# Patient Record
Sex: MALE | Race: WHITE | URBAN - METROPOLITAN AREA
[De-identification: names, ages, dates, MRNs, and addresses within clinical notes are randomized per-mention and may not be internally consistent; named-entity substitution may affect disease eponyms.]

---

## 2020-05-21 ENCOUNTER — TELEPHONE (OUTPATIENT)
Dept: CARDIOLOGY CLINIC | Age: 60
End: 2020-05-21

## 2020-05-28 ENCOUNTER — OFFICE VISIT (OUTPATIENT)
Dept: CARDIOLOGY CLINIC | Age: 60
End: 2020-05-28
Payer: COMMERCIAL

## 2020-05-28 ENCOUNTER — TELEPHONE (OUTPATIENT)
Dept: CARDIOLOGY | Age: 60
End: 2020-05-28

## 2020-05-28 ENCOUNTER — TELEPHONE (OUTPATIENT)
Dept: CARDIOLOGY CLINIC | Age: 60
End: 2020-05-28

## 2020-05-28 VITALS
HEIGHT: 72 IN | HEART RATE: 74 BPM | SYSTOLIC BLOOD PRESSURE: 118 MMHG | BODY MASS INDEX: 28.44 KG/M2 | DIASTOLIC BLOOD PRESSURE: 78 MMHG | RESPIRATION RATE: 16 BRPM | WEIGHT: 210 LBS | TEMPERATURE: 97.9 F

## 2020-05-28 PROCEDURE — G8419 CALC BMI OUT NRM PARAM NOF/U: HCPCS | Performed by: INTERNAL MEDICINE

## 2020-05-28 PROCEDURE — 99245 OFF/OP CONSLTJ NEW/EST HI 55: CPT | Performed by: INTERNAL MEDICINE

## 2020-05-28 PROCEDURE — G8427 DOCREV CUR MEDS BY ELIG CLIN: HCPCS | Performed by: INTERNAL MEDICINE

## 2020-05-28 PROCEDURE — 93000 ELECTROCARDIOGRAM COMPLETE: CPT | Performed by: INTERNAL MEDICINE

## 2020-05-28 RX ORDER — LORATADINE 10 MG/1
10 CAPSULE, LIQUID FILLED ORAL DAILY
COMMUNITY
End: 2020-07-28

## 2020-05-28 RX ORDER — MULTIVIT WITH MINERALS/LUTEIN
250 TABLET ORAL DAILY
COMMUNITY

## 2020-05-28 NOTE — TELEPHONE ENCOUNTER
Per Dr. Patsy Fuentes, patient needs WILBUR workup per PCP. Will also need stress and echo results. Called Dr. Laurie Arredondo' office, office closed.
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
## 2020-06-01 ENCOUNTER — TELEPHONE (OUTPATIENT)
Dept: CARDIOLOGY CLINIC | Age: 60
End: 2020-06-01

## 2020-06-01 ENCOUNTER — TELEPHONE (OUTPATIENT)
Dept: CARDIOLOGY | Age: 60
End: 2020-06-01

## 2020-06-01 ENCOUNTER — HOSPITAL ENCOUNTER (OUTPATIENT)
Dept: CARDIOLOGY | Age: 60
Discharge: HOME | End: 2020-06-01
Payer: COMMERCIAL

## 2020-06-01 VITALS
SYSTOLIC BLOOD PRESSURE: 140 MMHG | BODY MASS INDEX: 28.44 KG/M2 | OXYGEN SATURATION: 98 % | WEIGHT: 210 LBS | DIASTOLIC BLOOD PRESSURE: 70 MMHG | HEIGHT: 72 IN | HEART RATE: 77 BPM

## 2020-06-01 LAB
LV EF: 75 %
LVEF MODALITY: NORMAL

## 2020-06-01 PROCEDURE — 3430000000 HC RX DIAGNOSTIC RADIOPHARMACEUTICAL: Performed by: INTERNAL MEDICINE

## 2020-06-01 PROCEDURE — 78452 HT MUSCLE IMAGE SPECT MULT: CPT

## 2020-06-01 PROCEDURE — 2580000003 HC RX 258: Performed by: INTERNAL MEDICINE

## 2020-06-01 PROCEDURE — 93017 CV STRESS TEST TRACING ONLY: CPT

## 2020-06-01 PROCEDURE — A9500 TC99M SESTAMIBI: HCPCS | Performed by: INTERNAL MEDICINE

## 2020-06-01 RX ORDER — SODIUM CHLORIDE 0.9 % (FLUSH) 0.9 %
10 SYRINGE (ML) INJECTION PRN
Status: DISCONTINUED | OUTPATIENT
Start: 2020-06-01 | End: 2020-06-02 | Stop reason: HOSPADM

## 2020-06-01 RX ADMIN — SODIUM CHLORIDE, PRESERVATIVE FREE 10 ML: 5 INJECTION INTRAVENOUS at 09:13

## 2020-06-01 RX ADMIN — Medication 30.2 MILLICURIE: at 09:13

## 2020-06-01 RX ADMIN — SODIUM CHLORIDE, PRESERVATIVE FREE 10 ML: 5 INJECTION INTRAVENOUS at 07:47

## 2020-06-01 RX ADMIN — Medication 10.4 MILLICURIE: at 07:47

## 2020-06-09 ENCOUNTER — HOSPITAL ENCOUNTER (OUTPATIENT)
Dept: SLEEP CENTER | Age: 60
Discharge: HOME | End: 2020-06-09
Payer: COMMERCIAL

## 2020-06-09 VITALS
OXYGEN SATURATION: 98 % | HEART RATE: 74 BPM | TEMPERATURE: 98.4 F | RESPIRATION RATE: 18 BRPM | SYSTOLIC BLOOD PRESSURE: 126 MMHG | BODY MASS INDEX: 28.31 KG/M2 | HEIGHT: 72 IN | WEIGHT: 209 LBS | DIASTOLIC BLOOD PRESSURE: 84 MMHG

## 2020-06-09 PROCEDURE — 95810 POLYSOM 6/> YRS 4/> PARAM: CPT

## 2020-06-09 ASSESSMENT — SLEEP AND FATIGUE QUESTIONNAIRES
HOW LIKELY ARE YOU TO NOD OFF OR FALL ASLEEP WHILE SITTING INACTIVE IN A PUBLIC PLACE: 0
HOW LIKELY ARE YOU TO NOD OFF OR FALL ASLEEP WHILE SITTING AND TALKING TO SOMEONE: 0
ESS TOTAL SCORE: 6
HOW LIKELY ARE YOU TO NOD OFF OR FALL ASLEEP WHILE SITTING AND READING: 1
HOW LIKELY ARE YOU TO NOD OFF OR FALL ASLEEP WHEN YOU ARE A PASSENGER IN A CAR FOR AN HOUR WITHOUT A BREAK: 1
HOW LIKELY ARE YOU TO NOD OFF OR FALL ASLEEP WHILE WATCHING TV: 2
HOW LIKELY ARE YOU TO NOD OFF OR FALL ASLEEP IN A CAR, WHILE STOPPED FOR A FEW MINUTES IN TRAFFIC: 0
HOW LIKELY ARE YOU TO NOD OFF OR FALL ASLEEP WHILE SITTING QUIETLY AFTER LUNCH WITHOUT ALCOHOL: 0
HOW LIKELY ARE YOU TO NOD OFF OR FALL ASLEEP WHILE LYING DOWN TO REST IN THE AFTERNOON WHEN CIRCUMSTANCES PERMIT: 2

## 2020-06-10 NOTE — PROGRESS NOTES
[address] [address]                               SLEEP STUDY REPORT    PATIENT NAME: Susan Edmondson                  :        1960  MED REC NO:   [id number]                            ROOM:  ACCOUNT NO:   [de-identified]                           ADMIT DATE: 2020  PROVIDER:     Joi Farley MD    DATE OF STUDY:  2020    REFERRING PROVIDER:  Andreas Pizano M.D.    STUDY PERFORMED:  Polysomnography. INDICATION FOR POLYSOMNOGRAPHY:  Witnessed apnea, disruptive snoring,  fatigue, and trouble with memory/concentration. CURRENT MEDICATIONS:  Claritin, atorvastatin, and vitamins. INTERPRETATION:  SLEEP ARCHITECTURE:  This patient had a total time in bed of 429  minutes. Total sleep time was 384 minutes. Sleep efficiency was 90%. Sleep latency was five minutes. REM latency was 181 minutes. SLEEP STAGING:  The patient was awake 10% of the time in bed. Stage N1  was 22% and N2 was 57% of the total sleep time. Slow wave sleep was  less than 1% of the sleep time and stage REM sleep was 21% of the sleep  time. RESPIRATION SUMMARY:  APNEA:  There were five apneic events including one central, two  obstructive. and two mixed. Maximum duration of an apneic event was 26  seconds and one event occurred during REM stage sleep. HYPOPNEA:  There were 97 hypopneic events, 32 occurred during REM stage  sleep. Maximum duration was 121 seconds. APNEA/HYPOPNEA INDEX:  The apnea/hypopnea index is 16. Of the 102  respiratory events, 52 occurred in the supine position. AROUSAL ANALYSIS:  There were 101 arousals/awakenings, 50 associated  with respiratory event. The sleep disruption index is 16 (normal less  than 10). LIMB MOVEMENT SUMMARY:  There were 163 limb movements, the limb movement  index is 26 (normal less than 15). OXYGEN SATURATION:  Average oxygen saturation while awake was 93%.

## 2020-07-15 ENCOUNTER — TELEPHONE (OUTPATIENT)
Dept: CARDIOLOGY | Age: 60
End: 2020-07-15

## 2020-07-15 NOTE — TELEPHONE ENCOUNTER
CALLED PATIENT AND LEFT MESSAGE TO SCHEDULE ECHO  Electronically signed by Halley Santo on 7/15/2020 at 8:32 AM

## 2020-07-16 ENCOUNTER — TELEPHONE (OUTPATIENT)
Dept: CARDIOLOGY | Age: 60
End: 2020-07-16

## 2020-07-16 NOTE — TELEPHONE ENCOUNTER
SCHEDULED ECHO FOR 07-22-20. REVIEWED COVID CHECKLIST WITH PATIENT.   Electronically signed by Mary Cheney on 7/16/2020 at 8:04 AM

## 2020-07-21 ENCOUNTER — TELEPHONE (OUTPATIENT)
Dept: CARDIOLOGY | Age: 60
End: 2020-07-21

## 2020-07-22 ENCOUNTER — HOSPITAL ENCOUNTER (OUTPATIENT)
Dept: CARDIOLOGY | Age: 60
Discharge: HOME | End: 2020-07-22
Payer: COMMERCIAL

## 2020-07-22 LAB
LV EF: 60 %
LVEF MODALITY: NORMAL

## 2020-07-22 PROCEDURE — 93306 TTE W/DOPPLER COMPLETE: CPT | Performed by: PSYCHIATRY & NEUROLOGY

## 2020-07-27 ENCOUNTER — TELEPHONE (OUTPATIENT)
Dept: CARDIOLOGY CLINIC | Age: 60
End: 2020-07-27

## 2020-07-27 PROBLEM — I35.1 NONRHEUMATIC AORTIC VALVE INSUFFICIENCY: Status: ACTIVE | Noted: 2020-07-27

## 2020-07-27 NOTE — TELEPHONE ENCOUNTER
Dr. Moises Curtis spoke directly to patient regarding echo results. Jaqueline in Dr. María Elena Jansen office notified of need for amended echo report.

## 2020-09-01 PROBLEM — C61 MALIGNANT NEOPLASM OF PROSTATE (HCC): Status: ACTIVE | Noted: 2017-02-27

## 2020-09-01 PROBLEM — E78.5 DYSLIPIDEMIA: Status: ACTIVE | Noted: 2017-04-07

## 2021-07-26 ENCOUNTER — TELEPHONE (OUTPATIENT)
Dept: CARDIOLOGY CLINIC | Age: 61
End: 2021-07-26

## 2021-09-03 ENCOUNTER — OFFICE VISIT (OUTPATIENT)
Dept: CARDIOLOGY CLINIC | Age: 61
End: 2021-09-03
Payer: COMMERCIAL

## 2021-09-03 VITALS
RESPIRATION RATE: 18 BRPM | HEART RATE: 67 BPM | DIASTOLIC BLOOD PRESSURE: 72 MMHG | HEIGHT: 72 IN | BODY MASS INDEX: 27.83 KG/M2 | SYSTOLIC BLOOD PRESSURE: 118 MMHG | WEIGHT: 205.5 LBS

## 2021-09-03 DIAGNOSIS — R07.9 CHEST PAIN, UNSPECIFIED TYPE: Primary | ICD-10-CM

## 2021-09-03 PROCEDURE — 99213 OFFICE O/P EST LOW 20 MIN: CPT | Performed by: INTERNAL MEDICINE

## 2021-09-03 PROCEDURE — G8419 CALC BMI OUT NRM PARAM NOF/U: HCPCS | Performed by: INTERNAL MEDICINE

## 2021-09-03 PROCEDURE — 1036F TOBACCO NON-USER: CPT | Performed by: INTERNAL MEDICINE

## 2021-09-03 PROCEDURE — G8427 DOCREV CUR MEDS BY ELIG CLIN: HCPCS | Performed by: INTERNAL MEDICINE

## 2021-09-03 PROCEDURE — 93000 ELECTROCARDIOGRAM COMPLETE: CPT | Performed by: INTERNAL MEDICINE

## 2021-09-03 PROCEDURE — 3017F COLORECTAL CA SCREEN DOC REV: CPT | Performed by: INTERNAL MEDICINE

## 2021-09-03 NOTE — PROGRESS NOTES
Casimiro Richey  1960  Date of Service: 9/3/2021    Patient Active Problem List    Diagnosis Date Noted    Nonrheumatic aortic valve insufficiency 07/27/2020     Overview Note:     Trace to mild on echo 7/22/2020      Dyslipidemia 04/07/2017    Malignant neoplasm of prostate ([address] 02/27/2017       Social History     Socioeconomic History    Marital status:      Spouse name: None    Number of children: None    Years of education: None    Highest education level: None   Occupational History    None   Tobacco Use    Smoking status: Never Smoker    Smokeless tobacco: Never Used   Vaping Use    Vaping Use: Never used   Substance and Sexual Activity    Alcohol use: No    Drug use: No    Sexual activity: None   Other Topics Concern    None   Social History Narrative    None     Social Determinants of Health     Financial Resource Strain:     Difficulty of Paying Living Expenses:    Food Insecurity:     Worried About Running Out of Food in the Last Year:     Ran Out of Food in the Last Year:    Transportation Needs:     Lack of Transportation (Medical):      Lack of Transportation (Non-Medical):    Physical Activity:     Days of Exercise per Week:     Minutes of Exercise per Session:    Stress:     Feeling of Stress :    Social Connections:     Frequency of Communication with Friends and Family:     Frequency of Social Gatherings with Friends and Family:     Attends Synagogue Services:     Active Member of Clubs or Organizations:     Attends Club or Organization Meetings:     Marital Status:    Intimate Partner Violence:     Fear of Current or Ex-Partner:     Emotionally Abused:     Physically Abused:     Sexually Abused:        Current Outpatient Medications   Medication Sig Dispense Refill    loratadine (CLARITIN) 10 MG tablet Take 10 mg by mouth daily      atorvastatin (LIPITOR) 20 MG tablet TAKE 1 TABLET DAILY 90 tablet 3    CPAP Machine MISC by Does not apply route      Multiple Vitamins-Minerals (CENTRUM ADULTS PO) Take by mouth      Ascorbic Acid (VITAMIN C) 250 MG tablet Take 250 mg by mouth daily      sildenafil (VIAGRA) 100 MG tablet Take 1 tablet by mouth as needed for Erectile Dysfunction 30 tablet 0    Aspirin (ASPIR-81 PO) Take 81 mg by mouth daily        No current facility-administered medications for this visit. Allergies   Allergen Reactions    Bee Venom Anaphylaxis    Pollen Extract Itching       Chief Complaint:  Stefano Short is here today for follow up and management/recomendations for aortic regurgitation    History of Present Illness: Stefano Short states that He does household chores, goes up the stairs, does yard work & goes shopping. 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 He denies any chest discomfort or dyspnea on exertion with any of the above activities. He denies orthopnea/PND, or lower extremity edema. He denies any palpitations or presyncopal symptoms. REVIEW OF SYSTEMS:  As above. Patient does not complain of any fever, chills, nausea, vomiting or diarrhea. No focal, motor or neurological deficits. No changes in his/her vision, hearing, bowel or bladder habits. He is not known to have a history of thyroid problems. No recent nose bleeds. PHYSICAL EXAM:  Vitals:    09/03/21 0844   BP: 118/72   Pulse: 67   Resp: 18   Weight: 205 lb 8 oz (93.2 kg)   Height: 6' (1.829 m)       GENERAL:  He is alert and oriented x 3, communicates well, in no distress. NECK:  No masses, trachea is mid position. Supple, full ROM, no JVD or bruits. No palpable thyromegaly or lymphadenopathy. HEART:  Regular rate and rhythm. Normal S1 and S2. There are no abnormal murmurs or gallops. LUNGS:  Clear to auscultation bilaterally. No use of accessory muscles. symmetrical excursion. ABDOMEN:  Soft, non-tender. Normal bowel sounds. EXTREMITIES:  Full ROM x 4.   No bilateral lower

## 2023-09-05 ENCOUNTER — TELEPHONE (OUTPATIENT)
Dept: ADMINISTRATIVE | Age: 63
End: 2023-09-05

## 2023-09-05 NOTE — TELEPHONE ENCOUNTER
Pt having soreness in chest near underarm. Would like to see Dr De Lockett soon. No answer when called office. Please call pt back.  [phone]

## 2023-09-05 NOTE — TELEPHONE ENCOUNTER
Patient has complaints of pain between his arm and his chest.  It has been on the left side a couple of times over the last several months. For the last couple of weeks it has been more frequent but on the right side. The symptoms occur at rest and can be associated with some hand numbness. He denies any symptoms with exertion.

## 2023-09-06 ENCOUNTER — OFFICE VISIT (OUTPATIENT)
Dept: CARDIOLOGY CLINIC | Age: 63
End: 2023-09-06
Payer: COMMERCIAL

## 2023-09-06 VITALS
OXYGEN SATURATION: 96 % | RESPIRATION RATE: 16 BRPM | SYSTOLIC BLOOD PRESSURE: 112 MMHG | BODY MASS INDEX: 27.9 KG/M2 | DIASTOLIC BLOOD PRESSURE: 70 MMHG | HEART RATE: 81 BPM | HEIGHT: 72 IN | WEIGHT: 206 LBS

## 2023-09-06 DIAGNOSIS — R07.9 CHEST PAIN, UNSPECIFIED TYPE: Primary | ICD-10-CM

## 2023-09-06 DIAGNOSIS — R06.02 SOB (SHORTNESS OF BREATH): ICD-10-CM

## 2023-09-06 DIAGNOSIS — I35.1 NONRHEUMATIC AORTIC VALVE INSUFFICIENCY: ICD-10-CM

## 2023-09-06 PROCEDURE — G8427 DOCREV CUR MEDS BY ELIG CLIN: HCPCS | Performed by: INTERNAL MEDICINE

## 2023-09-06 PROCEDURE — 1036F TOBACCO NON-USER: CPT | Performed by: INTERNAL MEDICINE

## 2023-09-06 PROCEDURE — G8419 CALC BMI OUT NRM PARAM NOF/U: HCPCS | Performed by: INTERNAL MEDICINE

## 2023-09-06 PROCEDURE — 3017F COLORECTAL CA SCREEN DOC REV: CPT | Performed by: INTERNAL MEDICINE

## 2023-09-06 PROCEDURE — 99214 OFFICE O/P EST MOD 30 MIN: CPT | Performed by: INTERNAL MEDICINE

## 2023-09-06 PROCEDURE — 93000 ELECTROCARDIOGRAM COMPLETE: CPT | Performed by: INTERNAL MEDICINE

## 2023-09-06 RX ORDER — LORATADINE 10 MG/1
10 CAPSULE, LIQUID FILLED ORAL DAILY
COMMUNITY

## 2023-09-06 NOTE — PROGRESS NOTES
Zeb Pimentel  1960  Date of Service: 9/6/2023    Patient Active Problem List    Diagnosis Date Noted    Nonrheumatic aortic valve insufficiency 07/27/2020     Overview Note:     Trace to mild on echo 7/22/2020        Dyslipidemia 04/07/2017    Malignant neoplasm of prostate ([address]) 02/27/2017       Social History     Socioeconomic History    Marital status:      Spouse name: None    Number of children: None    Years of education: None    Highest education level: None   Tobacco Use    Smoking status: Never    Smokeless tobacco: Never   Vaping Use    Vaping Use: Never used   Substance and Sexual Activity    Alcohol use: Yes     Comment: beer social    Drug use: No    Sexual activity: Not Currently       Current Outpatient Medications   Medication Sig Dispense Refill    loratadine (CLARITIN) 10 MG capsule Take 1 capsule by mouth daily      cyanocobalamin 2000 MCG tablet Take 1 tablet by mouth daily      Vitamin E 268 MG (400 UNIT) CHEW Take 400 Units by mouth daily      levothyroxine (SYNTHROID) 25 MCG tablet TAKE 1 TABLET BY MOUTH EVERY DAY      Cholecalciferol (VITAMIN D3) 50 MCG (2000 UT) CHEW Take 4,000 Units by mouth daily      atorvastatin (LIPITOR) 20 MG tablet TAKE 1 TABLET DAILY 90 tablet 3    Ascorbic Acid (VITAMIN C) 1000 MG tablet Take 1 tablet by mouth daily      Aspirin (ASPIR-81 PO) Take 81 mg by mouth daily        No current facility-administered medications for this visit. Allergies   Allergen Reactions    Bee Venom Anaphylaxis    Pollen Extract Itching       Chief Complaint:  Zeb Pimentel is here today for follow up and management/recomendations for aortic regurgitation    History of Present Illness: Zeb Pimentel is being seen urgently today for chest discomfort. He states that for the past 6 months he has had intermittent episodes of soreness on the left side of his chest.  This was occurring approximately once per month.   However, recently it has increased to

## 2023-09-07 ENCOUNTER — TELEPHONE (OUTPATIENT)
Dept: CARDIOLOGY CLINIC | Age: 63
End: 2023-09-07

## 2023-09-08 ENCOUNTER — TELEPHONE (OUTPATIENT)
Dept: CARDIOLOGY CLINIC | Age: 63
End: 2023-09-08

## 2023-09-08 ENCOUNTER — HOSPITAL ENCOUNTER (OUTPATIENT)
Dept: CARDIOLOGY | Age: 63
Discharge: HOME | End: 2023-09-08
Attending: INTERNAL MEDICINE
Payer: COMMERCIAL

## 2023-09-08 VITALS
HEART RATE: 58 BPM | HEIGHT: 72 IN | RESPIRATION RATE: 12 BRPM | DIASTOLIC BLOOD PRESSURE: 76 MMHG | SYSTOLIC BLOOD PRESSURE: 118 MMHG | WEIGHT: 206 LBS | BODY MASS INDEX: 27.9 KG/M2

## 2023-09-08 DIAGNOSIS — R07.9 CHEST PAIN, UNSPECIFIED TYPE: ICD-10-CM

## 2023-09-08 DIAGNOSIS — R06.02 SOB (SHORTNESS OF BREATH): ICD-10-CM

## 2023-09-08 DIAGNOSIS — I35.1 NONRHEUMATIC AORTIC VALVE INSUFFICIENCY: ICD-10-CM

## 2023-09-08 PROCEDURE — 93017 CV STRESS TEST TRACING ONLY: CPT

## 2023-09-08 PROCEDURE — 93306 TTE W/DOPPLER COMPLETE: CPT

## 2023-09-08 NOTE — TELEPHONE ENCOUNTER
----- Message from Inna Smith DO sent at 9/8/2023  1:29 PM EDT -----  Let him know that there is no blood clot to cause chest pain or shortness of breath.

## 2023-09-08 NOTE — TELEPHONE ENCOUNTER
Patient notified of echo results and Dr. Guerline Mendieta recommendations. Patient put on list to call when September 2024 schedule opens.

## 2023-09-08 NOTE — TELEPHONE ENCOUNTER
----- Message from Elbert Pretty DO sent at 9/8/2023  4:11 PM EDT -----  Please let him know that the echo shows that the heart function is good. The aortic regurgitation is still only mild. The pressure in the lungs is normal.  There is nothing here that should cause any symptoms. He has no physical limitations from a cardiology standpoint. Follow-up in 1 year.

## 2023-09-08 NOTE — PROCEDURES
[facility] - [facility], [address]  [phone]    Exercise Stress Study (non-nuclear)      Name: Jamal Navas Account Number:  [de-identified]      :  1960          Sex: male         Date of Study:  2023    Height: 6' (182.9 cm)          Weight - Scale: 206 lb (93.4 kg)    Ordering Provider: Golden Ramos DO          PCP: Monica Aaron MD    Cardiologist: Golden Ramos DO              Interpreting Physician: Golden aRmos DO    Indication:   Detecting the presence and location of coronary artery disease    Clinical History:   Patient has no known history of coronary artery disease. Resting ECG:    Sinus bradycardia, 58 bpm.  Normal axis. Exercise: The patient exercised using a Evgeny protocol, completing 9:35 minutes and reaching an estimated work load of 38.19 metabolic equivalents (METS). Resting HR was 58. Peak exercise heart rate was 141 ( 90% of maximum predicted heart rate for age). Baseline /76. Peak exercise /72. The blood pressure response to exercise was normal      Exercise was terminated due to dyspnea. The patient experienced no chest pain with exercise. Pulse oximetry was used to monitor oxygen saturation during the stress test.  The study was performed on Room Air. The resting pulse oximeter was 98%. The lowest O2 saturation seen during exercise was 96 %. The average O2 saturation with exercise was 96 %. Exercise ECG:   The patient demonstrated no arrhythmias during exercise. With exercise, there were no ST segment changes of significance at the heart rate achieved. Garcia treadmill score was 9.5 implying low risk. Impression:    Exercise EKG was    negative. The patient experienced no chest pain with exercise. Garcia treadmill score was 9.5 implying low risk. Exercise capacity was above average.     Low risk general exercise

## 2023-10-27 PROBLEM — E55.9 VITAMIN D DEFICIENCY: Status: ACTIVE | Noted: 2023-10-27

## 2023-11-03 PROBLEM — E03.9 HYPOTHYROIDISM: Status: ACTIVE | Noted: 2023-11-03

## 2024-08-29 ENCOUNTER — TELEPHONE (OUTPATIENT)
Dept: CARDIOLOGY CLINIC | Age: 64
End: 2024-08-29

## 2024-08-29 NOTE — TELEPHONE ENCOUNTER
Patient calling back in to reschedule his yearly appointment due to provider not being in office. Advised we do not have any availability at this time and advised once the new schedules are released we can schedule him at that time. Patient is asking to be fit on schedule as he originally scheduled this yearly visit in February. Please contact patient to reschedule.

## 2024-08-29 NOTE — TELEPHONE ENCOUNTER
F/U scheduled for 11/26/24 at 10:10 a.m.  Left voicemail for patient with date/time and asked that he call me if this barahona not work for him and we will put him on list to schedule when January 2025 schedule comes out.

## 2024-11-26 ENCOUNTER — OFFICE VISIT (OUTPATIENT)
Dept: CARDIOLOGY CLINIC | Age: 64
End: 2024-11-26
Payer: MEDICARE

## 2024-11-26 VITALS
RESPIRATION RATE: 18 BRPM | SYSTOLIC BLOOD PRESSURE: 98 MMHG | WEIGHT: 209.1 LBS | TEMPERATURE: 97.8 F | HEIGHT: 71 IN | HEART RATE: 73 BPM | BODY MASS INDEX: 29.27 KG/M2 | OXYGEN SATURATION: 93 % | DIASTOLIC BLOOD PRESSURE: 64 MMHG

## 2024-11-26 DIAGNOSIS — R07.9 CHEST PAIN, UNSPECIFIED TYPE: Primary | ICD-10-CM

## 2024-11-26 DIAGNOSIS — I35.1 NONRHEUMATIC AORTIC VALVE INSUFFICIENCY: ICD-10-CM

## 2024-11-26 PROCEDURE — 93000 ELECTROCARDIOGRAM COMPLETE: CPT | Performed by: INTERNAL MEDICINE

## 2024-11-26 PROCEDURE — G8419 CALC BMI OUT NRM PARAM NOF/U: HCPCS | Performed by: INTERNAL MEDICINE

## 2024-11-26 PROCEDURE — 3017F COLORECTAL CA SCREEN DOC REV: CPT | Performed by: INTERNAL MEDICINE

## 2024-11-26 PROCEDURE — 1036F TOBACCO NON-USER: CPT | Performed by: INTERNAL MEDICINE

## 2024-11-26 PROCEDURE — G8427 DOCREV CUR MEDS BY ELIG CLIN: HCPCS | Performed by: INTERNAL MEDICINE

## 2024-11-26 PROCEDURE — 1123F ACP DISCUSS/DSCN MKR DOCD: CPT | Performed by: INTERNAL MEDICINE

## 2024-11-26 PROCEDURE — 99214 OFFICE O/P EST MOD 30 MIN: CPT | Performed by: INTERNAL MEDICINE

## 2024-11-26 PROCEDURE — G8484 FLU IMMUNIZE NO ADMIN: HCPCS | Performed by: INTERNAL MEDICINE

## 2024-11-26 RX ORDER — CETIRIZINE HCL 1 MG/ML
10 SOLUTION, ORAL ORAL DAILY
COMMUNITY

## 2024-11-26 NOTE — PROGRESS NOTES
activities including going up flights of stairs, doing household chores, doing yard work, and going shopping.  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  He denies any chest discomfort or dyspnea while performing any of these activities.  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  He occasionally gets some atypical discomforts in his chest or shoulders that are not associate with any physical activities.  He denies orthopnea/PND, or lower extremity edema.  He denies any palpitations or presyncopal symptoms.      REVIEW OF SYSTEMS:  As above. Patient does not complain of any fever, chills, nausea, vomiting or diarrhea. No focal, motor or neurological deficits. No changes in his/her vision, hearing, bowel or bladder habits.  He is not known to have a history of thyroid problems.  No recent nose bleeds.    PHYSICAL EXAM:  Vitals:    11/26/24 1048   BP: 98/64   Pulse: 73   Resp: 18   Temp: 97.8 °F (36.6 °C)   SpO2: 93%   Weight: 94.8 kg (209 lb 1.6 oz)   Height: 1.803 m (5' 11\")       GENERAL:  He is alert and oriented x 3, communicates well, in no distress.   NECK:  No masses, trachea is mid position.  Supple, full ROM, no JVD or bruits.  No palpable thyromegaly or lymphadenopathy.   HEART:  Regular rate and rhythm. Normal S1 and S2. There are no abnormal murmurs or gallops on exam.    LUNGS:  Clear to auscultation bilaterally.  No use of accessory muscles.  symmetrical excursion.  Good air movement.  ABDOMEN:  Soft, non-tender.  Normal bowel sounds.  EXTREMITIES:  Full ROM x 4.  No bilateral lower extremity edema on exam.  Good distal pulses.   EYES:  Extraocular muscles intact.  PERRL.  Normal lids & conjunctiva.  ENT:  Nares are clear & not bleeding.  Moist mucosa.  Normal lips formation.  No external masses   NEURO: no tremors, full ROM x 4, EOMI.  SKIN:  Warm, dry and intact.  Normal turgor.        EKG: Sinus rhythm, 73 bpm, nl axis, nonspecific